# Patient Record
Sex: FEMALE | Race: WHITE | NOT HISPANIC OR LATINO | Employment: STUDENT | URBAN - METROPOLITAN AREA
[De-identification: names, ages, dates, MRNs, and addresses within clinical notes are randomized per-mention and may not be internally consistent; named-entity substitution may affect disease eponyms.]

---
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
## 2023-11-23 ENCOUNTER — HOSPITAL ENCOUNTER (EMERGENCY)
Facility: HOSPITAL | Age: 18
Discharge: HOME | End: 2023-11-23
Attending: EMERGENCY MEDICINE
Payer: COMMERCIAL

## 2023-11-23 VITALS
OXYGEN SATURATION: 98 % | SYSTOLIC BLOOD PRESSURE: 165 MMHG | RESPIRATION RATE: 18 BRPM | HEART RATE: 100 BPM | DIASTOLIC BLOOD PRESSURE: 72 MMHG | TEMPERATURE: 98 F

## 2023-11-23 DIAGNOSIS — H66.90 OTITIS MEDIA: Primary | ICD-10-CM

## 2023-11-23 LAB
FLUAV RNA RESP QL NAA+PROBE: NEGATIVE
FLUBV RNA RESP QL NAA+PROBE: NEGATIVE
RSV RNA RESP QL NAA+PROBE: NEGATIVE
S PYO DNA THROAT QL NAA+PROBE: NOT DETECTED
SARS-COV-2 RNA RESP QL NAA+PROBE: NEGATIVE

## 2023-11-23 PROCEDURE — 87651 STREP A DNA AMP PROBE: CPT

## 2023-11-23 PROCEDURE — 0241U HB NFCT DS VIR RESP RNA 4 TRGT: CPT

## 2023-11-23 PROCEDURE — 99284 EMERGENCY DEPT VISIT MOD MDM: CPT

## 2023-11-23 PROCEDURE — 99283 EMERGENCY DEPT VISIT LOW MDM: CPT

## 2023-11-23 RX ORDER — AMOXICILLIN AND CLAVULANATE POTASSIUM 875; 125 MG/1; MG/1
1 TABLET, FILM COATED ORAL EVERY 12 HOURS SCHEDULED
Qty: 14 TABLET | Refills: 0 | Status: SHIPPED | OUTPATIENT
Start: 2023-11-23 | End: 2023-11-30

## 2023-11-23 RX ORDER — ACETAMINOPHEN 325 MG/1
650 TABLET ORAL ONCE
Status: COMPLETED | OUTPATIENT
Start: 2023-11-23 | End: 2023-11-23

## 2023-11-23 RX ORDER — AMOXICILLIN AND CLAVULANATE POTASSIUM 875; 125 MG/1; MG/1
1 TABLET, FILM COATED ORAL ONCE
Status: COMPLETED | OUTPATIENT
Start: 2023-11-23 | End: 2023-11-23

## 2023-11-23 RX ADMIN — ACETAMINOPHEN 650 MG: 325 TABLET, FILM COATED ORAL at 03:06

## 2023-11-23 RX ADMIN — AMOXICILLIN AND CLAVULANATE POTASSIUM 1 TABLET: 875; 125 TABLET, FILM COATED ORAL at 03:26

## 2023-11-23 NOTE — DISCHARGE INSTRUCTIONS
Please take antibiotics as prescribed. You can take over-the-counter pain medication as needed for symptom relief. Please follow-up with ENT. Please follow-up with your PCP. Return to the emergency department for any worsening symptoms, like fever, headache, stiff neck, worsening pain.

## 2023-11-23 NOTE — ED PROVIDER NOTES
History  Chief Complaint   Patient presents with    Earache     Right ear pain x1 day. Neck pain day before x1day, denies fever. Took motrin and flonase. Neck pain subsided. Pt states nasal congestion and sore throat      Patient is a 20-year-old female who presents to the emergency room with her father at bedside. Patient reports right ear pain since yesterday. Reports right-sided neck pain started and then progressed to right ear pain. Associated nasal congestion, sore throat, tinnitus in her right ear. Denies hearing loss, drainage from the right ear. Denies fever, chills, headache, chest pain, palpitations, cough, shortness of breath, abdominal pain, nausea, vomiting. Patient denies symptoms in her left ear. Reports she has taken Tylenol and nasal spray. Last dose of Tylenol at 3 PM yesterday. Patient is up-to-date on vaccines. Prior to Admission Medications   Prescriptions Last Dose Informant Patient Reported? Taking? Acetaminophen (TYLENOL PO)  Self Yes No   Sig: Take by mouth   ibuprofen (ADVIL,MOTRIN) 100 MG tablet  Self Yes No   Sig: Take 100 mg by mouth every 6 (six) hours as needed for mild pain      Facility-Administered Medications: None       No past medical history on file. No past surgical history on file. No family history on file. I have reviewed and agree with the history as documented. E-Cigarette/Vaping    E-Cigarette Use Never User      E-Cigarette/Vaping Substances    Nicotine No     THC No     CBD No     Flavoring No     Other No     Unknown No      Social History     Tobacco Use    Smoking status: Never    Smokeless tobacco: Never   Vaping Use    Vaping Use: Never used   Substance Use Topics    Alcohol use: Never    Drug use: Never       Review of Systems   Constitutional:  Negative for chills and fever. HENT:  Positive for congestion, ear pain, rhinorrhea, sore throat and tinnitus (right ear).  Negative for ear discharge, facial swelling, trouble swallowing and voice change. Eyes:  Negative for photophobia and redness. Respiratory:  Negative for cough and shortness of breath. Cardiovascular:  Negative for chest pain and palpitations. Gastrointestinal:  Negative for abdominal distention, abdominal pain, nausea and vomiting. Genitourinary:  Negative for flank pain. Musculoskeletal:  Positive for neck pain (right side). Negative for joint swelling and neck stiffness. Skin:  Negative for color change. Neurological:  Negative for dizziness, facial asymmetry, speech difficulty, weakness and headaches. Psychiatric/Behavioral:  Negative for confusion. Physical Exam  Physical Exam  Vitals and nursing note reviewed. Constitutional:       General: She is not in acute distress. Appearance: She is well-developed. HENT:      Head: Normocephalic and atraumatic. Right Ear: Hearing, tympanic membrane and external ear normal.      Left Ear: Hearing, tympanic membrane, ear canal and external ear normal.      Ears:      Comments: Patient has tenderness over her right mastoid. No overlying skin changes. No erythema, edema, warmth. Tenderness extends down her right neck. No tenderness over her left mastoid. Right ear canal erythematous with associated edema. TM unremarkable. No effusion. Left ear canal and TM unremarkable. No erythema, edema, effusion. No drainage noted from the ears. Mouth/Throat:      Mouth: Mucous membranes are moist.      Pharynx: Posterior oropharyngeal erythema present. Eyes:      Conjunctiva/sclera: Conjunctivae normal.   Cardiovascular:      Rate and Rhythm: Normal rate and regular rhythm. Heart sounds: No murmur heard. Pulmonary:      Effort: Pulmonary effort is normal. No respiratory distress. Breath sounds: Normal breath sounds. Abdominal:      Palpations: Abdomen is soft. Tenderness: There is no abdominal tenderness. Musculoskeletal:         General: No swelling.       Cervical back: Normal range of motion and neck supple. No rigidity or tenderness. Lymphadenopathy:      Cervical: No cervical adenopathy. Skin:     General: Skin is warm and dry. Capillary Refill: Capillary refill takes less than 2 seconds. Neurological:      Mental Status: She is alert. Psychiatric:         Mood and Affect: Mood normal.         Vital Signs  ED Triage Vitals   Temperature Pulse Respirations Blood Pressure SpO2   11/23/23 0247 11/23/23 0248 11/23/23 0247 11/23/23 0247 11/23/23 0247   98 °F (36.7 °C) 100 18 165/72 98 %      Temp Source Heart Rate Source Patient Position - Orthostatic VS BP Location FiO2 (%)   11/23/23 0247 11/23/23 0248 11/23/23 0247 11/23/23 0247 --   Oral Monitor Sitting Right arm       Pain Score       11/23/23 0247       8           Vitals:    11/23/23 0247 11/23/23 0248   BP: 165/72    Pulse:  100   Patient Position - Orthostatic VS: Sitting          Visual Acuity      ED Medications  Medications   acetaminophen (TYLENOL) tablet 650 mg (650 mg Oral Given 11/23/23 0306)   amoxicillin-clavulanate (AUGMENTIN) 875-125 mg per tablet 1 tablet (1 tablet Oral Given 11/23/23 0326)       Diagnostic Studies  Results Reviewed       Procedure Component Value Units Date/Time    FLU/RSV/COVID - if FLU/RSV clinically relevant [[id number]]  (Normal) Collected: 11/23/23 0306    Lab Status: Final result Specimen: Nares from Nose Updated: 11/23/23 0355     SARS-CoV-2 Negative     INFLUENZA A PCR Negative     INFLUENZA B PCR Negative     RSV PCR Negative    Narrative:      FOR PEDIATRIC PATIENTS - copy/paste COVID Guidelines URL to browser: [email or web address]    SARS-CoV-2 assay is a Nucleic Acid Amplification assay intended for the  qualitative detection of nucleic acid from SARS-CoV-2 in nasopharyngeal  swabs. Results are for the presumptive identification of SARS-CoV-2 RNA.     Positive results are indicative of infection with SARS-CoV-2, the virus  causing COVID-19, but do not rule out bacterial infection or co-infection  with other viruses. Laboratories within the [facility] and its  territories are required to report all positive results to the appropriate  public health authorities. Negative results do not preclude SARS-CoV-2  infection and should not be used as the sole basis for treatment or other  patient management decisions. Negative results must be combined with  clinical observations, patient history, and epidemiological information. This test has not been FDA cleared or approved. This test has been authorized by FDA under an Emergency Use Authorization  (EUA). This test is only authorized for the duration of time the  declaration that circumstances exist justifying the authorization of the  emergency use of an in vitro diagnostic tests for detection of SARS-CoV-2  virus and/or diagnosis of COVID-19 infection under section 564(b)(1) of  the Act, 21 U. S.C. 845FDI-7(P)(5), unless the authorization is terminated  or revoked sooner. The test has been validated but independent review by FDA  and CLIA is pending. Test performed using [employer]: This RT-PCR assay targets N2,  a region unique to SARS-CoV-2. A conserved region in the E-gene was chosen  for pan-Sarbecovirus detection which includes SARS-CoV-2. According to CMS-2020-01-R, this platform meets the definition of high-throughput technology. Strep A PCR [[id number]]  (Normal) Collected: 11/23/23 0306    Lab Status: Final result Specimen: Throat Updated: 11/23/23 0342     STREP A PCR Not Detected                   No orders to display              Procedures  Procedures         ED Course         CRAFFT      Flowsheet Row Most Recent Value   GEOFFREY Initial Screen: During the past 12 months, did you:    1. Drink any alcohol (more than a few sips)? No Filed at: 11/23/2023 0314   2. Smoke any marijuana or hashish No Filed at: 11/23/2023 0314   3.  Use anything else to get high? ("anything else" includes illegal drugs, over the counter and prescription drugs, and things that you sniff or 'collins')? No Filed at: 11/23/2023 4232                                            Medical Decision Making  Patient is a 78-year-old female who presents today for right ear pain with associated sore throat, congestion, tinnitus. Patient reports neck pain on the right side started prior to the ear pain. Vital signs stable in the emergency room. On exam, patient has tenderness over her right mastoid without overlying skin changes. Her right ear canal is erythematous with associated edema. Flu, RSV, COVID, strep negative. Discussed risk versus benefit of doing a CT scan to look at her mastoid. At this time, patient would like to trial antibiotics prior to CT scan. Patient was given a dose of Augmentin and Tylenol here. Patient prescribed Augmentin. Patient given strict return precautions. Patient to take over-the-counter pain medication as needed for symptom relief. Patient given an ambulatory referral to ENT, she will follow-up with them. Patient to follow-up with PCP. Patient to return to emergency room for any worsening symptoms. Patient understands agrees with treatment plan. Problems Addressed:  Otitis media: acute illness or injury    Amount and/or Complexity of Data Reviewed  Labs: ordered. Risk  OTC drugs. Prescription drug management. Disposition  Final diagnoses:   Otitis media     Time reflects when diagnosis was documented in both MDM as applicable and the Disposition within this note       Time User Action Codes Description Comment    11/23/2023  3:12 AM Gil Piper Add [H66.90] Otitis media           ED Disposition       ED Disposition   Discharge    Condition   Stable    Date/Time   Thu Nov 23, 2023 4460 Kevin Gibbs discharge to home/self care.                    Follow-up Information       Follow up With Specialties Details Why Contact Info Additional 215 Leydi Wooten MD Pediatrics   [address] [facility] [address] [place] [address] [facility] Emergency Department Emergency Medicine Go to  If symptoms worsen [address] [facility] [address] [facility] [phone] [address] [facility] Emergency Department, [place], [place], [address] [facility], [address] [facility] Throat Otolaryngology   [address] [place] [address] [facility] [address] [facility] [address] [place] [address], [address] [facility].   [address] [facility], 100 [facility]            Discharge Medication List as of 11/23/2023  3:15 AM        START taking these medications    Details   amoxicillin-clavulanate (AUGMENTIN) 875-125 mg per tablet Take 1 tablet by mouth every 12 (twelve) hours for 7 days, Starting Thu 11/23/2023, Until Thu 11/30/2023, Normal           CONTINUE these medications which have NOT CHANGED    Details   Acetaminophen (TYLENOL PO) Take by mouth, Historical Med      ibuprofen (ADVIL,MOTRIN) 100 MG tablet Take 100 mg by mouth every 6 (six) hours as needed for mild pain, Historical Med                 PDMP Review       None            ED Provider  Electronically Signed by             Lexii Hernandez PA-C  11/23/23 9787

## 2023-11-23 NOTE — ED ATTENDING ATTESTATION
11/23/2023  ITy MD, saw and evaluated the patient. I have discussed the patient with the resident/non-physician practitioner and agree with the resident's/non-physician practitioner's findings, Plan of Care, and MDM as documented in the resident's/non-physician practitioner's note, except where noted. All available labs and Radiology studies were reviewed. I was present for key portions of any procedure(s) performed by the resident/non-physician practitioner and I was immediately available to provide assistance. At this point I agree with the current assessment done in the Emergency Department. I have conducted an independent evaluation of this patient a history and physical is as follows:    19 y.o. female presents with one day of right sided otalgia that has been worsening. Patient noted to be afebrile in the ER and denies any fever at home. Patient states yesterday she had pain along the right lateral aspect of her neck just inferior to the ear though this resolved and she has had persistent otalgia since then. Patient notes nasal congestion along with mild pharyngitis. ROS: Patient denies any otorrhea, facial paresis, tinnitus, nausea/vomiting. Patient noted to have all childhood immunizations. Objective:  Constitutional: Well-developed, well-nourished in no acute distress  Eyes: no conjunctival injection  ENMT: normal external ear with no rash including any no signs of cellulitis or herpes zoster, no hematoma or other signs of trauma; no tragal tenderness and normal external auditory canal with no signs of otitis externa; no otorrhea and abnormal right TM without obscuration hyperemic TM that is mildly bulging with an air fluid level and without signs of perforation or bullae. Normal pre-auricular inspection with no palpation of tender lymph nodes. Normal parotid exam, no swelling or tenderness.  No trismus with normal oropharyngeal exam.  Neck: there is some tenderness in the region of the mastoid but without without erythema and no palpation of enlarged post-auricular noted. Normal range of motion with no meningeal signs. CV: Normal rate and rhythm. Resp: Unlabored respiratory effort with no accessory muscle use. Neuro: normal CN VII exam motor exam with no signs of facial paresis or facial asymmetry     Medical Decision Making   78-year-old female presenting with multiple symptoms but a chief complaint of right otalgia. Patient with pain yesterday in the region of the mastoid and does have some tenderness in this region though there is no significant erythema or ecchymosis. Patient is afebrile and appears clinically well on examination. Patient without meningeal signs. Patient does have associated symptoms of nasal congestion and pharyngitis raising suspicion for potential viral process however considering the constellation of symptoms, appropriate to treat patient for otitis media with concerns for potential progression to mastoiditis. Offered patient imaging in the emergency room to evaluate for any signs of bony disruption though after discussion of risks and benefits, patient prefers attempted symptomatic management with antibiotics and close follow-up with ENT or return to the emergency with any progression or worsening in symptoms. Patient appears to have capacity to make medical decisions and I have discussed risks and benefits in detail with the patient. Overall, patient appears clinically well at present and without signs of meningitis. Fortunately patient has been fully vaccinated as a child. Discussed close follow-up provided information with ENT or return to the emergency with any progression or worsening symptoms, which patient and her family affirmed understanding. Discussed and emphasized return precautions and the need for follow-up in detail.     ED Course         Critical Care Time  Procedures